# Patient Record
Sex: MALE | URBAN - METROPOLITAN AREA
[De-identification: names, ages, dates, MRNs, and addresses within clinical notes are randomized per-mention and may not be internally consistent; named-entity substitution may affect disease eponyms.]

---

## 2023-08-01 ENCOUNTER — WEB ENCOUNTER (OUTPATIENT)
Dept: URBAN - METROPOLITAN AREA CLINIC 113 | Facility: CLINIC | Age: 33
End: 2023-08-01

## 2023-08-01 ENCOUNTER — OFFICE VISIT (OUTPATIENT)
Dept: URBAN - METROPOLITAN AREA CLINIC 113 | Facility: CLINIC | Age: 33
End: 2023-08-01
Payer: COMMERCIAL

## 2023-08-01 VITALS
WEIGHT: 315 LBS | RESPIRATION RATE: 18 BRPM | DIASTOLIC BLOOD PRESSURE: 78 MMHG | SYSTOLIC BLOOD PRESSURE: 120 MMHG | HEIGHT: 73 IN | TEMPERATURE: 96.9 F | BODY MASS INDEX: 41.75 KG/M2 | HEART RATE: 74 BPM

## 2023-08-01 DIAGNOSIS — R19.7 INTERMITTENT DIARRHEA: ICD-10-CM

## 2023-08-01 DIAGNOSIS — R74.8 ELEVATED LIVER ENZYMES: ICD-10-CM

## 2023-08-01 DIAGNOSIS — K76.0 HEPATIC STEATOSIS: ICD-10-CM

## 2023-08-01 PROBLEM — 197321007: Status: ACTIVE | Noted: 2023-08-01

## 2023-08-01 PROCEDURE — 99204 OFFICE O/P NEW MOD 45 MIN: CPT | Performed by: INTERNAL MEDICINE

## 2023-08-01 PROCEDURE — 99244 OFF/OP CNSLTJ NEW/EST MOD 40: CPT | Performed by: INTERNAL MEDICINE

## 2023-08-01 RX ORDER — ORAL SEMAGLUTIDE 3 MG/1
AS DIRECTED TABLET ORAL
Status: ACTIVE | COMMUNITY

## 2023-08-01 RX ORDER — CHLORTHALIDONE 25 MG/1
1 TABLET IN THE MORNING WITH FOOD TABLET ORAL
Status: ACTIVE | COMMUNITY

## 2023-08-01 RX ORDER — LOSARTAN POTASSIUM 25 MG/1
1 TABLET TABLET ORAL ONCE A DAY
Status: ACTIVE | COMMUNITY

## 2023-08-01 NOTE — HPI-TODAY'S VISIT:
32-year-old female with a history of hypertension, sleep apnea, obesity and vitamin B12 deficiency Is referred by Dr. Francisco Javier Birch for evaluation of elevated liver enzymes.  A copy of today's visit will be forwarded to the referring provider.  Labs 6/7/2023:Normal GGT, normal PT/INR, elevated serum ferritin 603, ALT 87, AST 55, normal alk phos, normal total bilirubin, low cortisol levels 5.5, hemoglobin A1c 5.9, normal testosterone, normal vitamin B12. Labs 5/2/2023:Normal total bilirubin, , AST 16, normal alk phos, cholesterol 222, triglycerides 236, normal TSH, unremarkable CBC.  Right upper quadrant ultrasound with elastography 5/23/2023:Hepatomegaly and steatosis with focal fatty sparing adjacent to the gallbladder fossa.  No bile duct dilatation, mass or abnormal shear wave velocity.  He was a heavy ETOH user in the past. He would consume 4-8 drinks 3-4 nights. This was typically beer or seltzer. He stopped ETOH about two months ago.His mother had  a "liver infection." He denies any new stating medications. He did switch from HCTZ to chlorthalidone recently.   He denies any recent iron infusions or treatments. He was tested for HH which was negative. He had negative hepatitis serologies. Denies nausea, vomiting or abdominal pain. He did have intermittent diarrhea prior to stopping ETOH, starting his KETO diet and starting Rybelsus. He also had GERD prior to stopping ETOH. He has lost 40 pounds with his KETO diet.

## 2023-08-01 NOTE — PHYSICAL EXAM CONSTITUTIONAL:
normal , pleasant, well nourished, in no acute distress, wearing a left ankle boot, ambulating with a crutch

## 2023-08-12 LAB
A/G RATIO: 1.7
ABSOLUTE BASOPHILS: 33
ABSOLUTE EOSINOPHILS: 180
ABSOLUTE LYMPHOCYTES: 1558
ABSOLUTE MONOCYTES: 640
ABSOLUTE NEUTROPHILS: 5789
ACTIN (SMOOTH MUSCLE) ANTIBODY (IGG): <20
ALBUMIN: 4.7
ALKALINE PHOSPHATASE: 50
ALPHA-1-ANTITRYPSIN (AAT) PHENOTYPE: (no result)
ALPHA-1-ANTITRYPSIN QN: 120
ALT (SGPT): 25
ANA SCREEN, IFA: POSITIVE
AST (SGOT): 16
BASOPHILS: 0.4
BILIRUBIN, TOTAL: 0.5
BUN/CREATININE RATIO: (no result)
BUN: 22
CALCIUM: 10.1
CARBON DIOXIDE, TOTAL: 23
CERULOPLASMIN: 31
CHLORIDE: 100
CREATININE: 1.02
EGFR: 100
EOSINOPHILS: 2.2
GLOBULIN, TOTAL: 2.7
GLUCOSE: 98
HEMATOCRIT: 43.8
HEMOGLOBIN: 14.6
IMMUNOGLOBULIN A: 248
IMMUNOGLOBULIN G: 863
IMMUNOGLOBULIN M: 56
LKM-1 ANTIBODY (IGG): <=20
LYMPHOCYTES: 19
MCH: 30.7
MCHC: 33.3
MCV: 92
MITOCHONDRIAL (M2) ANTIBODY: <=20
MONOCYTES: 7.8
MPV: 9.2
NEUTROPHILS: 70.6
PLATELET COUNT: 388
POTASSIUM: 3.6
PROTEIN, TOTAL: 7.4
RDW: 13
RED BLOOD CELL COUNT: 4.76
SODIUM: 139
T-TRANSGLUTAMINASE (TTG) IGA: <1
WHITE BLOOD CELL COUNT: 8.2

## 2023-10-17 ENCOUNTER — OFFICE VISIT (OUTPATIENT)
Dept: URBAN - METROPOLITAN AREA CLINIC 113 | Facility: CLINIC | Age: 33
End: 2023-10-17
Payer: COMMERCIAL

## 2023-10-17 VITALS
DIASTOLIC BLOOD PRESSURE: 82 MMHG | BODY MASS INDEX: 37.51 KG/M2 | SYSTOLIC BLOOD PRESSURE: 141 MMHG | HEART RATE: 78 BPM | TEMPERATURE: 97.7 F | HEIGHT: 73 IN | WEIGHT: 283 LBS

## 2023-10-17 DIAGNOSIS — K62.89 ANAL PAIN: ICD-10-CM

## 2023-10-17 DIAGNOSIS — K62.5 BRIGHT RED BLOOD PER RECTUM: ICD-10-CM

## 2023-10-17 DIAGNOSIS — K64.8 INTERNAL HEMORRHOID: ICD-10-CM

## 2023-10-17 PROCEDURE — 99214 OFFICE O/P EST MOD 30 MIN: CPT | Performed by: NURSE PRACTITIONER

## 2023-10-17 RX ORDER — CHLORTHALIDONE 25 MG/1
1 TABLET IN THE MORNING WITH FOOD TABLET ORAL
Status: ACTIVE | COMMUNITY

## 2023-10-17 RX ORDER — LOSARTAN POTASSIUM 25 MG/1
1 TABLET TABLET ORAL ONCE A DAY
Status: ACTIVE | COMMUNITY

## 2023-10-17 RX ORDER — ORAL SEMAGLUTIDE 3 MG/1
AS DIRECTED TABLET ORAL
Status: ON HOLD | COMMUNITY

## 2023-10-17 NOTE — HPI-TODAY'S VISIT:
31 yo with elevated LFTs, nonalcoholic fatty liver disease, presenting for next day appointment for blood per rectum.  He was seen in the office on 8/1/23 for elevated liver function tests, in the setting of prior heavy alcohol use. Hepatitis serologies were negative. US showed hepatomegaly and hepatic steatosis. Autoimmune markers were negative with exeption to positive DENICE (1:40 titer).  He has been experiencing red blood per rectum, small in volume, on the tissue with bowel movements for the last month. He is in pain currently, and is having trouble sitting straight on his bottom.

## 2023-10-17 NOTE — PHYSICAL EXAM RECTAL:
No external hemorrhoids appreciated. There is not any evidence of anal fissure appreciated on exam. There does appear to be internal hemorrhoids present, and tenderness with gloved finger entering the anus.

## 2023-11-01 ENCOUNTER — OFFICE VISIT (OUTPATIENT)
Dept: URBAN - METROPOLITAN AREA CLINIC 113 | Facility: CLINIC | Age: 33
End: 2023-11-01
Payer: COMMERCIAL

## 2023-11-01 VITALS
DIASTOLIC BLOOD PRESSURE: 87 MMHG | BODY MASS INDEX: 37.64 KG/M2 | HEIGHT: 73 IN | WEIGHT: 284 LBS | HEART RATE: 67 BPM | TEMPERATURE: 97.5 F | SYSTOLIC BLOOD PRESSURE: 135 MMHG

## 2023-11-01 DIAGNOSIS — K62.89 ANAL PAIN: ICD-10-CM

## 2023-11-01 DIAGNOSIS — K62.5 BRIGHT RED BLOOD PER RECTUM: ICD-10-CM

## 2023-11-01 DIAGNOSIS — K76.0 HEPATIC STEATOSIS: ICD-10-CM

## 2023-11-01 DIAGNOSIS — K64.8 INTERNAL HEMORRHOID: ICD-10-CM

## 2023-11-01 PROCEDURE — 99214 OFFICE O/P EST MOD 30 MIN: CPT

## 2023-11-01 RX ORDER — LOSARTAN POTASSIUM 25 MG/1
1 TABLET TABLET ORAL ONCE A DAY
Status: ACTIVE | COMMUNITY

## 2023-11-01 RX ORDER — SODIUM, POTASSIUM,MAG SULFATES 17.5-3.13G
354 ML SOLUTION, RECONSTITUTED, ORAL ORAL ONCE
Qty: 354 MILLILITER | Refills: 0 | OUTPATIENT
Start: 2023-11-01 | End: 2023-11-02

## 2023-11-01 RX ORDER — CHLORTHALIDONE 25 MG/1
1 TABLET IN THE MORNING WITH FOOD TABLET ORAL
Status: ACTIVE | COMMUNITY

## 2023-11-01 RX ORDER — ORAL SEMAGLUTIDE 3 MG/1
AS DIRECTED TABLET ORAL
Status: ON HOLD | COMMUNITY

## 2023-11-01 RX ORDER — DEXTROAMPHETAMINE SACCHARATE, AMPHETAMINE ASPARTATE, DEXTROAMPHETAMINE SULFATE, AND AMPHETAMINE SULFATE 5; 5; 5; 5 MG/1; MG/1; MG/1; MG/1
1 TABLET TABLET ORAL TWICE A DAY
Status: ACTIVE | COMMUNITY

## 2023-11-01 NOTE — PHYSICAL EXAM MUSCULOSKELETAL:
Patient's BMI is 36 76  Counseled on diet exercise and lifestyle modification normal gait and station , full range of motion

## 2023-11-01 NOTE — HPI-TODAY'S VISIT:
32-year-old male presents for follow-up. He was last seen on 10/17/2023. He has had blood per rectum and anal pain likely multifactorial from internal hemorrhoids and anal fissure. Rectal exam was notable for internal hemorrhoids and pain with gloved finger entering the anus. Obvious anal fissures were not appreciated. Symptoms did not seem to be improving with topical hydrocortisone cream. Given this he was changed to nifedipine/lidocaine cream. He was to use sits baths, avoid sitting for long periods if able and/or use a donut pillow to offset pressure when prolonged sitting is unavoidable. He requested a referral to colorectal surgery for evaluation. We would consider colonoscopy pending clinical course.  He has not had much improvement with topical nifedipine/lidocaine cream. He felt the donut pillow made his pain worse as well. He does use a seat heater when driving which seems to help. He has been alternating 1000 mg of Tylenol with 400 mg of ibuprofen daily for a few weeks. He cannot sleep without the medication due to the pain. His BRBPR resolved 3 weeks ago. He was not contacted by colorectal surgery. He is hesitant to undergo surgery.  10/17/2023: 33 yo with elevated LFTs, nonalcoholic fatty liver disease, presenting for next day appointment for blood per rectum.   He was seen in the office on 8/1/23 for elevated liver function tests, in the setting of prior heavy alcohol use. Hepatitis serologies were negative. US showed hepatomegaly and hepatic steatosis. Autoimmune markers were negative with exeption to positive DENICE (1:40 titer).  He has been experiencing red blood per rectum, small in volume, on the tissue with bowel movements for the last month. He is in pain currently, and is having trouble sitting straight on his bottom.

## 2023-11-02 LAB
A/G RATIO: 2
ABSOLUTE BASOPHILS: 39
ABSOLUTE EOSINOPHILS: 220
ABSOLUTE LYMPHOCYTES: 1353
ABSOLUTE MONOCYTES: 402
ABSOLUTE NEUTROPHILS: 3487
ALBUMIN: 4.9
ALKALINE PHOSPHATASE: 50
ALT (SGPT): 26
AST (SGOT): 18
BASOPHILS: 0.7
BILIRUBIN, TOTAL: 0.4
BUN/CREATININE RATIO: 27
BUN: 28
CALCIUM: 9.9
CARBON DIOXIDE, TOTAL: 29
CHLORIDE: 99
CREATININE: 1.03
EGFR: 98
EOSINOPHILS: 4
GLOBULIN, TOTAL: 2.4
GLUCOSE: 93
HEMATOCRIT: 40.9
HEMOGLOBIN: 13.7
LYMPHOCYTES: 24.6
MCH: 30.6
MCHC: 33.5
MCV: 91.5
MONOCYTES: 7.3
MPV: 9.5
NEUTROPHILS: 63.4
PLATELET COUNT: 324
POTASSIUM: 4.9
PROTEIN, TOTAL: 7.3
RDW: 12.9
RED BLOOD CELL COUNT: 4.47
SODIUM: 137
WHITE BLOOD CELL COUNT: 5.5

## 2023-11-10 ENCOUNTER — OUT OF OFFICE VISIT (OUTPATIENT)
Dept: URBAN - METROPOLITAN AREA SURGERY CENTER 25 | Facility: SURGERY CENTER | Age: 33
End: 2023-11-10
Payer: COMMERCIAL

## 2023-11-10 ENCOUNTER — CLAIMS CREATED FROM THE CLAIM WINDOW (OUTPATIENT)
Dept: URBAN - METROPOLITAN AREA CLINIC 4 | Facility: CLINIC | Age: 33
End: 2023-11-10
Payer: COMMERCIAL

## 2023-11-10 DIAGNOSIS — K64.1 SECOND DEGREE HEMORRHOIDS: ICD-10-CM

## 2023-11-10 DIAGNOSIS — K52.9 NONINFECTIVE GASTROENTERITIS AND COLITIS, UNSPECIFIED: ICD-10-CM

## 2023-11-10 DIAGNOSIS — K57.30 COLON, DIVERTICULOSIS: ICD-10-CM

## 2023-11-10 DIAGNOSIS — K52.9 ILEITIS: ICD-10-CM

## 2023-11-10 DIAGNOSIS — K62.5 ANAL BLEEDING: ICD-10-CM

## 2023-11-10 DIAGNOSIS — D12.4 ADENOMATOUS POLYP OF DESCENDING COLON: ICD-10-CM

## 2023-11-10 DIAGNOSIS — K63.5 BENIGN COLON POLYP: ICD-10-CM

## 2023-11-10 DIAGNOSIS — K52.89 OTHER SPECIFIED NONINFECTIVE GASTROENTERITIS AND COLITIS: ICD-10-CM

## 2023-11-10 PROCEDURE — 00811 ANES LWR INTST NDSC NOS: CPT | Performed by: ANESTHESIOLOGY

## 2023-11-10 PROCEDURE — 45385 COLONOSCOPY W/LESION REMOVAL: CPT | Performed by: INTERNAL MEDICINE

## 2023-11-10 PROCEDURE — 45380 COLONOSCOPY AND BIOPSY: CPT | Performed by: INTERNAL MEDICINE

## 2023-11-10 PROCEDURE — 00811 ANES LWR INTST NDSC NOS: CPT | Performed by: NURSE ANESTHETIST, CERTIFIED REGISTERED

## 2023-11-10 PROCEDURE — 88305 TISSUE EXAM BY PATHOLOGIST: CPT | Performed by: PATHOLOGY

## 2023-11-10 RX ORDER — CHLORTHALIDONE 25 MG/1
1 TABLET IN THE MORNING WITH FOOD TABLET ORAL
Status: ACTIVE | COMMUNITY

## 2023-11-10 RX ORDER — DEXTROAMPHETAMINE SACCHARATE, AMPHETAMINE ASPARTATE, DEXTROAMPHETAMINE SULFATE, AND AMPHETAMINE SULFATE 5; 5; 5; 5 MG/1; MG/1; MG/1; MG/1
1 TABLET TABLET ORAL TWICE A DAY
Status: ACTIVE | COMMUNITY

## 2023-11-10 RX ORDER — ORAL SEMAGLUTIDE 3 MG/1
AS DIRECTED TABLET ORAL
Status: ON HOLD | COMMUNITY

## 2023-11-10 RX ORDER — LOSARTAN POTASSIUM 25 MG/1
1 TABLET TABLET ORAL ONCE A DAY
Status: ACTIVE | COMMUNITY

## 2023-11-13 ENCOUNTER — TELEPHONE ENCOUNTER (OUTPATIENT)
Dept: URBAN - METROPOLITAN AREA CLINIC 113 | Facility: CLINIC | Age: 33
End: 2023-11-13

## 2023-12-28 ENCOUNTER — CLAIMS CREATED FROM THE CLAIM WINDOW (OUTPATIENT)
Dept: URBAN - METROPOLITAN AREA CLINIC 113 | Facility: CLINIC | Age: 33
End: 2023-12-28
Payer: COMMERCIAL

## 2023-12-28 ENCOUNTER — DASHBOARD ENCOUNTERS (OUTPATIENT)
Age: 33
End: 2023-12-28

## 2023-12-28 VITALS
BODY MASS INDEX: 37.51 KG/M2 | TEMPERATURE: 97.5 F | RESPIRATION RATE: 18 BRPM | SYSTOLIC BLOOD PRESSURE: 140 MMHG | WEIGHT: 283 LBS | DIASTOLIC BLOOD PRESSURE: 84 MMHG | HEIGHT: 73 IN | HEART RATE: 93 BPM

## 2023-12-28 DIAGNOSIS — K76.0 HEPATIC STEATOSIS: ICD-10-CM

## 2023-12-28 DIAGNOSIS — K62.89 ANAL PAIN: ICD-10-CM

## 2023-12-28 DIAGNOSIS — R74.8 ELEVATED LIVER ENZYMES: ICD-10-CM

## 2023-12-28 DIAGNOSIS — K62.5 BRIGHT RED BLOOD PER RECTUM: ICD-10-CM

## 2023-12-28 DIAGNOSIS — R19.7 INTERMITTENT DIARRHEA: ICD-10-CM

## 2023-12-28 DIAGNOSIS — K64.8 INTERNAL HEMORRHOID: ICD-10-CM

## 2023-12-28 PROCEDURE — 99213 OFFICE O/P EST LOW 20 MIN: CPT | Performed by: INTERNAL MEDICINE

## 2023-12-28 RX ORDER — DEXTROAMPHETAMINE SACCHARATE, AMPHETAMINE ASPARTATE, DEXTROAMPHETAMINE SULFATE, AND AMPHETAMINE SULFATE 5; 5; 5; 5 MG/1; MG/1; MG/1; MG/1
1 TABLET TABLET ORAL TWICE A DAY
Status: ACTIVE | COMMUNITY

## 2023-12-28 RX ORDER — OLMESARTAN MEDOXOMIL 40 MG/1
1 TABLET TABLET, FILM COATED ORAL ONCE A DAY
Status: ACTIVE | COMMUNITY

## 2023-12-28 RX ORDER — ORAL SEMAGLUTIDE 3 MG/1
AS DIRECTED TABLET ORAL
Status: ON HOLD | COMMUNITY

## 2023-12-28 RX ORDER — LOSARTAN POTASSIUM 25 MG/1
1 TABLET TABLET ORAL ONCE A DAY
Status: ON HOLD | COMMUNITY

## 2023-12-28 RX ORDER — CHLORTHALIDONE 25 MG/1
1 TABLET IN THE MORNING WITH FOOD TABLET ORAL
Status: ACTIVE | COMMUNITY

## 2023-12-28 NOTE — HPI-TODAY'S VISIT:
34 y/o male presenting for f/u. He was last seen in Nov 2023 for a CSC. He was found to have a diverticulum, polyp, and inflammation in the TI which did not show any chronic features. colon polyp was hyperplastic. He currently does feel better now. He is off NSAIDs.   He is off Olemsartan and Norvasc and feels better. He feels worse when on BP meds.    11/2023 32-year-old male presents for follow-up. He was last seen on 10/17/2023. He has had blood per rectum and anal pain likely multifactorial from internal hemorrhoids and anal fissure. Rectal exam was notable for internal hemorrhoids and pain with gloved finger entering the anus. Obvious anal fissures were not appreciated. Symptoms did not seem to be improving with topical hydrocortisone cream. Given this he was changed to nifedipine/lidocaine cream. He was to use sits baths, avoid sitting for long periods if able and/or use a donut pillow to offset pressure when prolonged sitting is unavoidable. He requested a referral to colorectal surgery for evaluation. We would consider colonoscopy pending clinical course.  He has not had much improvement with topical nifedipine/lidocaine cream. He felt the donut pillow made his pain worse as well. He does use a seat heater when driving which seems to help. He has been alternating 1000 mg of Tylenol with 400 mg of ibuprofen daily for a few weeks. He cannot sleep without the medication due to the pain. His BRBPR resolved 3 weeks ago. He was not contacted by colorectal surgery. He is hesitant to undergo surgery.  10/17/2023: 33 yo with elevated LFTs, nonalcoholic fatty liver disease, presenting for next day appointment for blood per rectum.   He was seen in the office on 8/1/23 for elevated liver function tests, in the setting of prior heavy alcohol use. Hepatitis serologies were negative. US showed hepatomegaly and hepatic steatosis. Autoimmune markers were negative with exeption to positive DENICE (1:40 titer).  He has been experiencing red blood per rectum, small in volume, on the tissue with bowel movements for the last month. He is in pain currently, and is having trouble sitting straight on his bottom.

## 2024-01-03 ENCOUNTER — OFFICE VISIT (OUTPATIENT)
Dept: URBAN - METROPOLITAN AREA CLINIC 113 | Facility: CLINIC | Age: 34
End: 2024-01-03